# Patient Record
Sex: FEMALE | URBAN - METROPOLITAN AREA
[De-identification: names, ages, dates, MRNs, and addresses within clinical notes are randomized per-mention and may not be internally consistent; named-entity substitution may affect disease eponyms.]

---

## 2023-05-23 ENCOUNTER — TELEPHONE (OUTPATIENT)
Dept: PLASTIC SURGERY | Facility: CLINIC | Age: 68
End: 2023-05-23

## 2023-06-12 ENCOUNTER — TELEPHONE (OUTPATIENT)
Dept: PLASTIC SURGERY | Facility: CLINIC | Age: 68
End: 2023-06-12

## 2023-06-12 NOTE — TELEPHONE ENCOUNTER
----- Message from Johanna Nestor sent at 6/12/2023  8:42 AM CDT -----  Regarding: Referral  Contact: patient  Per phone call with patient, she stated that she was referred by Dr Theron Recinos to schedule an appointment to see the physician for a breast reduction.  Please return call at [phone] ([phone]).    Thanks,  SJ

## 2023-06-14 ENCOUNTER — TELEPHONE (OUTPATIENT)
Dept: PLASTIC SURGERY | Facility: CLINIC | Age: 68
End: 2023-06-14

## 2023-06-14 NOTE — TELEPHONE ENCOUNTER
Rcvd breast reduction referral, pt is a current everyday smoker. Our office does not do consultations or submit to insurance until patients are 100% nicotine free. Pt will call when she has stopped smoking